# Patient Record
Sex: FEMALE | Race: WHITE | Employment: UNEMPLOYED | URBAN - NONMETROPOLITAN AREA
[De-identification: names, ages, dates, MRNs, and addresses within clinical notes are randomized per-mention and may not be internally consistent; named-entity substitution may affect disease eponyms.]

---
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
## 2025-07-13 ENCOUNTER — HOSPITAL ENCOUNTER (EMERGENCY)
Facility: HOSPITAL | Age: 20
Discharge: HOME | End: 2025-07-13
Attending: NURSE PRACTITIONER
Payer: COMMERCIAL

## 2025-07-13 ENCOUNTER — APPOINTMENT (OUTPATIENT)
Dept: CT IMAGING | Facility: HOSPITAL | Age: 20
End: 2025-07-13
Attending: NURSE PRACTITIONER
Payer: COMMERCIAL

## 2025-07-13 VITALS
SYSTOLIC BLOOD PRESSURE: 112 MMHG | HEIGHT: 67 IN | HEART RATE: 81 BPM | WEIGHT: 147.1 LBS | TEMPERATURE: 97.1 F | BODY MASS INDEX: 23.09 KG/M2 | RESPIRATION RATE: 18 BRPM | OXYGEN SATURATION: 100 % | DIASTOLIC BLOOD PRESSURE: 74 MMHG
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
## 2025-07-13 DIAGNOSIS — S06.0X0A CONCUSSION WITHOUT LOSS OF CONSCIOUSNESS, INITIAL ENCOUNTER: ICD-10-CM

## 2025-07-13 LAB — HCG UR QL: NEGATIVE

## 2025-07-13 PROCEDURE — 99284 EMERGENCY DEPT VISIT MOD MDM: CPT | Mod: 25 | Performed by: NURSE PRACTITIONER

## 2025-07-13 PROCEDURE — 81025 URINE PREGNANCY TEST: CPT | Performed by: NURSE PRACTITIONER

## 2025-07-13 PROCEDURE — 99283 EMERGENCY DEPT VISIT LOW MDM: CPT | Performed by: NURSE PRACTITIONER

## 2025-07-13 PROCEDURE — 70450 CT HEAD/BRAIN W/O DYE: CPT | Mod: 26 | Performed by: RADIOLOGY

## 2025-07-13 PROCEDURE — 70450 CT HEAD/BRAIN W/O DYE: CPT

## 2025-07-13 ASSESSMENT — ENCOUNTER SYMPTOMS
WEAKNESS: 0
LIGHT-HEADEDNESS: 0
HEMATOLOGIC/LYMPHATIC NEGATIVE: 1
FACIAL ASYMMETRY: 0
PSYCHIATRIC NEGATIVE: 1
CARDIOVASCULAR NEGATIVE: 1
ENDOCRINE NEGATIVE: 1
CONSTITUTIONAL NEGATIVE: 1
RESPIRATORY NEGATIVE: 1
EYES NEGATIVE: 1
BACK PAIN: 1
GASTROINTESTINAL NEGATIVE: 1
HEADACHES: 1
DIZZINESS: 0
NUMBNESS: 0

## 2025-07-13 ASSESSMENT — ACTIVITIES OF DAILY LIVING (ADL): ADLS_ACUITY_SCORE: 41

## 2025-07-13 ASSESSMENT — COLUMBIA-SUICIDE SEVERITY RATING SCALE - C-SSRS
2. HAVE YOU ACTUALLY HAD ANY THOUGHTS OF KILLING YOURSELF IN THE PAST MONTH?: NO
1. IN THE PAST MONTH, HAVE YOU WISHED YOU WERE DEAD OR WISHED YOU COULD GO TO SLEEP AND NOT WAKE UP?: NO
6. HAVE YOU EVER DONE ANYTHING, STARTED TO DO ANYTHING, OR PREPARED TO DO ANYTHING TO END YOUR LIFE?: NO

## 2025-07-14 NOTE — ED NOTES
KULWINDER Rodriguez, went to bedside to speak with patient about test results and patient was not in room. I went and checked room and bathroom and patient was not in there. Another staff member said they saw a woman walk about of room 6 and out of ER but thought it was family. KULWINDER Rodriguez, states he will call patient and update her on test results.
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
## 2025-07-14 NOTE — DISCHARGE INSTRUCTIONS
Pain control:   If your past medical conditions, allergies, current medications, or current status does not prevent you from using acetaminophen and/or ibuprofen, use the following:   Acetaminophen 650-1000 mg every 6 hours as needed for pain in addition to ibuprofen 400-600 mg every 6 hours as needed for pain.  Take these two medications together if wanted.    Remember that these are for AS NEEDED.  If not needed, do not take.